# Patient Record
Sex: MALE | Race: WHITE
[De-identification: names, ages, dates, MRNs, and addresses within clinical notes are randomized per-mention and may not be internally consistent; named-entity substitution may affect disease eponyms.]

---

## 2019-04-14 ENCOUNTER — HOSPITAL ENCOUNTER (EMERGENCY)
Dept: HOSPITAL 47 - EC | Age: 34
Discharge: HOME | End: 2019-04-14
Payer: COMMERCIAL

## 2019-04-14 VITALS — HEART RATE: 79 BPM | DIASTOLIC BLOOD PRESSURE: 88 MMHG | TEMPERATURE: 98.1 F | SYSTOLIC BLOOD PRESSURE: 155 MMHG

## 2019-04-14 VITALS — RESPIRATION RATE: 19 BRPM

## 2019-04-14 DIAGNOSIS — S62.306A: Primary | ICD-10-CM

## 2019-04-14 DIAGNOSIS — F41.9: ICD-10-CM

## 2019-04-14 DIAGNOSIS — Z88.0: ICD-10-CM

## 2019-04-14 DIAGNOSIS — Z79.899: ICD-10-CM

## 2019-04-14 DIAGNOSIS — I10: ICD-10-CM
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
## 2019-04-14 PROCEDURE — 29125 APPL SHORT ARM SPLINT STATIC: CPT

## 2019-04-14 PROCEDURE — 73130 X-RAY EXAM OF HAND: CPT

## 2019-04-14 PROCEDURE — 99284 EMERGENCY DEPT VISIT MOD MDM: CPT

## 2019-04-14 PROCEDURE — 96372 THER/PROPH/DIAG INJ SC/IM: CPT

## 2019-04-14 PROCEDURE — 73110 X-RAY EXAM OF WRIST: CPT

## 2019-04-14 NOTE — ED
Medical Decision Making





- Medical Decision Making





impression is fracture of 5th metacarpal 





Disposition


Clinical Impression: 


 Fracture of fifth metacarpal bone





Disposition: HOME SELF-CARE


Condition: Stable


Instructions (If sedation given, give patient instructions):  Hand Fracture (ED)


Additional Instructions: 


Patient to adhere to previously discussed treatment plan and will take m

edication(s) as directed. Patient to follow up with PCP in 1-2 days. Patient to 

return to ED if symptoms do not improve. 





Please follow-up with orthopedic surgeon 1-2 days.  Return to ER if condition 

worsens.  Please use tylenol and Motrin as needed for pain.


Is patient prescribed a controlled substance at d/c from ED?: No


Referrals: 


Constantino Garcia MD [Primary Care Provider] - 1-2 days


Derek Martínez MD [STAFF PHYSICIAN] - 1-2 days

## 2019-04-14 NOTE — XR
EXAMINATION TYPE: XR wrist complete RT

 

DATE OF EXAM: 4/14/2019

 

COMPARISON: NONE

 

HISTORY: Pain

 

TECHNIQUE: 4 views

 

FINDINGS: Carpal bones are intact. Distal radius and ulna appear intact. There is nondisplaced fractu
re proximal fifth metacarpal. There is possible nondisplaced fracture also of the base of the fourth 
metacarpal.

 

IMPRESSION: Fracture proximal fifth metacarpal. Possible fracture base of fourth metacarpal.

## 2019-04-14 NOTE — ED
General Adult HPI





- General


Chief complaint: Extremity Injury, Upper


Stated complaint: RT HAND INJURY


Time Seen by Provider: 04/14/19 19:13


Source: patient, RN notes reviewed, old records reviewed


Mode of arrival: ambulatory


Limitations: no limitations





- History of Present Illness


Initial comments: 


33-year-old male patient with past medical history of hypertension 

hyperlipidemia [facility] after sustaining a right hand injury.  Patient reports 

that he punched a door today, reports pain to his fifth metacarpal.  Patient 

denies any other complaints.  Patient has any other injury.  Patient denies any 

thoughts of hurting self or any other people.





Systemic: Pt denies fatigue, myalgia, fever/chills, rash. Pt denies weakness, 

night sweats, weight loss. 


Neuro: Pt denies headache, visual disturbances, syncope or pre-syncope.


HEENT: Pt denies ocular discharge or irritation, otalgia, rhinorrhea, 

pharyngitis or notable lymphadenopathy. 


Cardiopulmonary: Pt denies chest pain, SOB, heart palpitations, dyspnea on 

exertion.  


Abdominal/GI: Pt denies abdominal pain, n/v/d. 


: Pt denies dysuria, burning w/ urination, frequency/urgency. Denies new onset

urinary or bowel incontinence.  


MSK: Pt denies myalgia, loss of strength or function in extremities. 


Neuro: Pt denies new onset weakness, paresthesias. 








- Related Data


                                Home Medications











 Medication  Instructions  Recorded  Confirmed


 


FLUoxetine HCL [PROzac] 10 mg PO HS 10/28/15 11/26/15


 


amLODIPine [Norvasc] 5 mg PO HS 10/28/15 11/26/15








                                  Previous Rx's











 Medication  Instructions  Recorded


 


Ibuprofen [Motrin] 800 mg PO Q8HR PRN #30 tab 11/26/15











                                    Allergies











Allergy/AdvReac Type Severity Reaction Status Date / Time


 


amoxicillin Allergy Unknown Hives Verified 04/14/19 19:04














Review of Systems


ROS Statement: 


Those systems with pertinent positive or pertinent negative responses have been 

documented in the HPI.





ROS Other: All systems not noted in ROS Statement are negative.





Past Medical History


Past Medical History: Hyperlipidemia, Hypertension


Additional Past Medical History / Comment(s): BLOOD IN STOOL A COUPLE WEEKS AGO.

 anxiety


History of Any Multi-Drug Resistant Organisms: None Reported


Past Surgical History: No Surgical Hx Reported


Additional Past Surgical History / Comment(s): WISDOM TEETH.


Additional Past Anesthesia/Blood Transfusion Reaction / Comment(s): HAS NEVER 

HAD GENERAL ANESTHESIA.


Past Psychological History: Anxiety


Smoking Status: Never smoker


Past Alcohol Use History: Occasional


Past Drug Use History: None Reported





- Past Family History


  ** Mother


Family Medical History: Cancer


Additional Family Medical History / Comment(s): BREAST CANCER





General Exam





- General Exam Comments


Initial Comments: 





Constitutional: NAD, AOX3, Pt has pleasant affect. 


HEENT: NC/AT, trachea midline, neck supple, no lymphadenopathy. Posterior 

pharynx non erythematous, without exudates. External ears appear normal, without

 discharge. Mucous membranes moist. Eyes PERRLA, EOM intact. There is no scleral

 icterus. No pallor noted. 


Cardiopulmonary: RRR, no murmurs, rubs or gallops, no JVD noted. Lungs CTAB in 

anterior and posterior fields. No peripheral edema. 


Abdominal exam: Abdomen soft and non-distended. Abdomen non-tender to palpation 

in all 4 quadrants. Bowel sounds active in LLQ. No hepatosplenomegaly. No 

ecchymosis


Neuro: CN II-XII grossly intact. No nuchal rigidity. 


MSK: Fifth metacarpal mildly tender to palpation.  Full active range of motion 

of fifth digit.  Radial pulse +2 bilaterally.  Cap refill less than 2 seconds.  

No ecchymoses.  No posterior calf tenderness bilaterally, homans sign negative 

bilaterally. Posterior tibialis and radial pulse +2 bilaterally. Sensation 

intact in upper and lower extremities. Full active ROM in upper and lower 

extremities, 5/5 stregnth. 





Limitations: no limitations





Course


                                   Vital Signs











  04/14/19





  19:05


 


Temperature 98.1 F


 


Pulse Rate 79


 


Respiratory 18





Rate 


 


Blood Pressure 155/88


 


O2 Sat by Pulse 97





Oximetry 














Medical Decision Making





- Medical Decision Making


33-year-old male patient with past medical history of hypertension 

hyperlipidemia [facility] after sustaining a right hand injury.  Patient reports 

that he punched a door today, reports pain to his fifth metacarpal.  Patient den

ies any other complaints.  Patient has any other injury.  Patient denies any 

thoughts of hurting self or any other people.  Patient vital signs stable, 

afebrile. Physical exam displayed: Fifth metacarpal mildly tender to palpation. 

 Full active range of motion of fifth digit.  Radial pulse +2 bilaterally.  Cap 

refill less than 2 seconds.  No ecchymoses.  Plain film of hand and wrist 

displayed fracture of proximal fifth metacarpal.  Possible fracture of base of 

fourth metacarpal.  Patient placed on a gutter splint.  Patient neurovascular 

intact after splint placement.  Patient discharged with orthopedic follow-up.  

Patient will follow up with orthopedic consult 1-2 days.  Patient return to ED 

if patient worsens anyway.  Case discussed Dr. Rebolledo. 








Disposition


Clinical Impression: 


 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





Disposition: HOME SELF-CARE


Condition: Stable


Instructions (If sedation given, give patient instructions):  Hand Fracture (ED)


Additional Instructions: 


Patient to adhere to previously discussed treatment plan and will take 

medication(s) as directed. Patient to follow up with PCP in 1-2 days. Patient to

 return to ED if symptoms do not improve. 





Please follow-up with orthopedic surgeon 1-2 days.  Return to ER if condition 

worsens.  Please use tylenol and Motrin as needed for pain.


Is patient prescribed a controlled substance at d/c from ED?: No


Referrals: 


Constantino Garcia MD [Primary Care Provider] - 1-2 days


Derek Martínez MD [STAFF PHYSICIAN] - 1-2 days

## 2019-04-14 NOTE — XR
EXAMINATION TYPE: XR hand complete RT

 

DATE OF EXAM: 4/14/2019

 

COMPARISON: NONE

 

HISTORY: Pain

 

TECHNIQUE: 3 views

 

FINDINGS: There is nondisplaced transverse fracture across the base of the fifth metacarpal. There is
 no dislocation. There is mild soft tissue swelling. Joint spaces are normal.

 

IMPRESSION: Acute nondisplaced fracture proximal fifth metacarpal.

## 2022-11-14 ENCOUNTER — HOSPITAL ENCOUNTER (OUTPATIENT)
Dept: HOSPITAL 47 - LABWHC1 | Age: 37
Discharge: HOME | End: 2022-11-14
Attending: INTERNAL MEDICINE
Payer: MEDICAID

## 2022-11-14 DIAGNOSIS — I10: Primary | ICD-10-CM

## 2022-11-14 DIAGNOSIS — E78.5: ICD-10-CM

## 2022-11-14 LAB
BASOPHILS # BLD AUTO: 0.03 X 10*3/UL (ref 0–0.1)
BASOPHILS NFR BLD AUTO: 0.7 %
EOSINOPHIL # BLD AUTO: 0.16 X 10*3/UL (ref 0.04–0.35)
EOSINOPHIL NFR BLD AUTO: 3.8 %
ERYTHROCYTE [DISTWIDTH] IN BLOOD BY AUTOMATED COUNT: 5.05 X 10*6/UL (ref 4.4–5.6)
ERYTHROCYTE [DISTWIDTH] IN BLOOD: 12.2 % (ref 11.5–14.5)
HCT VFR BLD AUTO: 45 % (ref 39.6–50)
HGB BLD-MCNC: 15 G/DL (ref 13–17)
IMM GRANULOCYTES BLD QL AUTO: 0.2 %
LYMPHOCYTES # SPEC AUTO: 1.81 X 10*3/UL (ref 0.9–5)
LYMPHOCYTES NFR SPEC AUTO: 42.8 %
MCH RBC QN AUTO: 29.7 PG (ref 27–32)
MCHC RBC AUTO-ENTMCNC: 33.3 G/DL (ref 32–37)
MCV RBC AUTO: 89.1 FL (ref 80–97)
MONOCYTES # BLD AUTO: 0.45 X 10*3/UL (ref 0.2–1)
MONOCYTES NFR BLD AUTO: 10.6 %
NEUTROPHILS # BLD AUTO: 1.77 X 10*3/UL (ref 1.8–7.7)
NEUTROPHILS NFR BLD AUTO: 41.9 %
NRBC BLD AUTO-RTO: 0 /100 WBCS (ref 0–0)
PLATELET # BLD AUTO: 266 X 10*3/UL (ref 140–440)
WBC # BLD AUTO: 4.23 X 10*3/UL (ref 4.5–10)

## 2022-11-14 PROCEDURE — 86038 ANTINUCLEAR ANTIBODIES: CPT

## 2022-11-14 PROCEDURE — 85025 COMPLETE CBC W/AUTO DIFF WBC: CPT

## 2022-11-14 PROCEDURE — 36415 COLL VENOUS BLD VENIPUNCTURE: CPT

## 2022-11-14 PROCEDURE — 82525 ASSAY OF COPPER: CPT

## 2022-11-14 PROCEDURE — 86431 RHEUMATOID FACTOR QUANT: CPT

## 2022-11-14 PROCEDURE — 85652 RBC SED RATE AUTOMATED: CPT

## 2022-12-27 ENCOUNTER — HOSPITAL ENCOUNTER (OUTPATIENT)
Dept: HOSPITAL 47 - RADNMMAIN | Age: 37
Discharge: HOME | End: 2022-12-27
Attending: FAMILY MEDICINE
Payer: MEDICAID

## 2022-12-27 DIAGNOSIS — R07.89: Primary | ICD-10-CM

## 2022-12-27 PROCEDURE — 93017 CV STRESS TEST TRACING ONLY: CPT

## 2022-12-27 PROCEDURE — 78452 HT MUSCLE IMAGE SPECT MULT: CPT

## 2022-12-27 NOTE — CA
Exercise Stress Test Report 

 

 Name:    Randolph Marcus 

 

 MRN:    [id number] 

 

 Exam Date: 2022 09:16 

 

 Exam Location:      [place]  

                     Stress 

 

 Ht (in):     69     Wt (lb):     250    BSA:    2.27 

 

 Ordering Phys:       Catherine Aponte MD 

 

 Referring Phys:      RAFAEL, 

 

 Technologist:        Sergio Cr 

 

 Age:    37    Gender:    M 

 

 :    1985 

 Procedure CPT: 

 

 Indications:              R07.89 

 

 ICD-10 Codes: 

 

 Patient History:          CHEST PAIN, HTN, ELEVATED CHOLESTEROL  

                           LEVELS 

 

 Medications:              LISINOPRIL/ROVASTATIN/LEXAPRO/COPPER  

                           SUPPLEMENTS 

 

 Meds past 24 hrs: 

 

 Pretest Chest Pain: 

 

 STRESS TEST      [place] 

 

 Protocol 

 

 

 

 

 Exercise Duration (min:sec):         09:59 

 Max ST Depressions (mm):         0 

 Angina Score:     0 

 Quigley Score:    9.98 

 Resting HR (bpm):      93 

 

 Peak HR (bpm):         186 

 

 Resting BP (mmHg):       148    /   92 

 

 Peak BP (mmHg):       211   /   83 

 

 MPHR:    181     Target HR:      154 

 

 % MPHR:     103 

 METS:  11.8 

 

 Total Dose: 

 Peak Dose: 

 Atropine: 

 Double Product:       12136 

 

 BP Response: 

 

 Stress Termination:       TARGET HR REACHED/MAX EXERTION 

 

 Stress Symptoms: 

 NO SYMPTOMS 

 

 Stress Summary: 

 The patient's target heart rate was achieved, The hemodynamic  

 response to exercise was normal 

 

  ECG ANALYSIS 

 

 Resting ECG:     Sinus rhythm. Normal conduction. No arrhythmias.  

                  Normal repolarization. 

 

 Stress ECG:      No ECG evidence of ischemia with exercise. 

 

 CONCLUSIONS 

 Patient falls into low-risk group (DTS >= +5). This associates  

 the patient with an annual CV mortality <= 0.5%. 

 1.  Good exercise tolerance 

 2.  Normal electrocardiographic response to exercise with no  

 evidence of stress induced ischemia 

 

 Dr. Kyle Patterson MD 

 (Electronically Signed) 

 Final Date:      2022 11:24

## 2022-12-28 NOTE — NM
EXAMINATION TYPE: NM stress cardiolite complete

 

DATE OF EXAM: 12/27/2022

 

COMPARISON: NONE

 

HISTORY: R07.89

 

TECHNIQUE:  After the intravenous administration of 9.7 mCi Tc 99m Sestamibi - Rest images obtained 4
5 minutes post injection.  The patient exercised using a  RED protocol and 1 minute prior to peak e
xercise was injected with 25.0 mCi Tc 99m Sestamibi - Stress images obtained 35 minutes post injectio
n.

 

FINDINGS: 

 

Targeted heart rate was achieved during performance of the study. Review of stress and rest SPECT eder
ges demonstrates no distinct perfusion abnormality.  Gated analysis shows normal wall motion with an 
estimated left ventricular ejection fraction of 57 %.

 

IMPRESSION:  

 

No scintigraphic evidence for reversible ischemia

## 2023-04-14 ENCOUNTER — HOSPITAL ENCOUNTER (OUTPATIENT)
Dept: HOSPITAL 47 - ORWHC2ENDO | Age: 38
Discharge: HOME | End: 2023-04-14
Attending: INTERNAL MEDICINE
Payer: MEDICAID

## 2023-04-14 VITALS — DIASTOLIC BLOOD PRESSURE: 67 MMHG | HEART RATE: 76 BPM | SYSTOLIC BLOOD PRESSURE: 115 MMHG

## 2023-04-14 VITALS — TEMPERATURE: 97.4 F | RESPIRATION RATE: 16 BRPM

## 2023-04-14 DIAGNOSIS — K44.9: ICD-10-CM

## 2023-04-14 DIAGNOSIS — K29.50: Primary | ICD-10-CM

## 2023-04-14 DIAGNOSIS — Z79.899: ICD-10-CM

## 2023-04-14 DIAGNOSIS — I10: ICD-10-CM

## 2023-04-14 DIAGNOSIS — F41.9: ICD-10-CM

## 2023-04-14 DIAGNOSIS — E78.5: ICD-10-CM

## 2023-04-14 DIAGNOSIS — K21.00: ICD-10-CM

## 2023-04-14 PROCEDURE — 43239 EGD BIOPSY SINGLE/MULTIPLE: CPT

## 2023-04-14 PROCEDURE — 88305 TISSUE EXAM BY PATHOLOGIST: CPT

## 2023-04-14 PROCEDURE — 45378 DIAGNOSTIC COLONOSCOPY: CPT

## 2023-04-14 NOTE — P.PCN
Date of Procedure: 04/14/23


Procedure(s) Performed: 


Brief history:


Patient is a pleasant 37-year-old white male scheduled for an elective upper 

endoscopy as well as colonoscopy as a part of evaluation of abdominal pain, 

change in bowel habits and possible malabsorption.  Patient was recently 

diagnosed with decreased serum copper levels and is being evaluateD by Dr. Mayorga.





Procedure performed:


Esophagogastroduodenoscopy with biopsy


Colonoscopy





Preoperative diagnosis:


Abdominal pain/change in bowel habits/rule out malabsorption





Anesthesia: MAC





Procedure:


After informed consent was obtained from the patient  was brought into the 

endoscopy unit and IV  sedation was administered by anesthesia under continuous 

monitoring.  Initially upper endoscopy was done.  The Olympus  video 

endoscope was inserted inserted into the mouth and esophagus intubated without 

any difficulty and was gradually advanced into the stomach and duodenum and 

carefully examined.  The bulb and second part of the duodenum appeared normal.  

The scope was then withdrawn into the stomach adequately insufflated with air 

and upon careful examination  the antrum had mild gastritis and biopsies were 

done from this area.  Because of the body, cardia and fundus appeared normal.  

The scope was then withdrawn into the esophagus.  The GE junction was located at

40 cm to the incisors.  small hiatal hernia noted.   It appeared regular with no

erythema erosions or ulcerations. there was a 3 mm polyp at the GE junction 

which was biopsied.    Rest of the esophagus appeared normal.  Patient tolerated

the procedure well.





At this time the patient continued to remain sedation.  Initial digital rectal 

examination was normal.  Olympus  video colonoscope was then inserted into

the rectum and gradually advanced to the cecum without any difficulty.  Careful 

examination was performed as the scope was gradually being withdrawn.  The prep 

was excellent.  The cecum, ascending colon, transverse colon, descending colon, 

sigmoid colon and rectum appeared normal.  Retroflexion was performed in the 

rectum and no lesions were noted.  Patient tolerated the procedure well.





Impression:


1.  Upper endoscopy revealed small hiatal hernia,  3 mm GE junction polyp and 

gastritis


2.  Colonoscopy was within normal limits with no  evidence of colitis or 

colorectal neoplasia 








Recommendations:


Findings of this examination were discussed with the patient as well as his 

family.  He was advised to follow with the biopsy results. recommend repeat 

screening colonoscopy in 10 years.